# Patient Record
Sex: MALE | Race: WHITE | Employment: FULL TIME | URBAN - NONMETROPOLITAN AREA
[De-identification: names, ages, dates, MRNs, and addresses within clinical notes are randomized per-mention and may not be internally consistent; named-entity substitution may affect disease eponyms.]

---

## 2024-07-26 ENCOUNTER — OFFICE VISIT (OUTPATIENT)
Dept: FAMILY MEDICINE | Facility: OTHER | Age: 39
End: 2024-07-26

## 2024-07-26 VITALS
HEIGHT: 70 IN | DIASTOLIC BLOOD PRESSURE: 80 MMHG | OXYGEN SATURATION: 98 % | WEIGHT: 153 LBS | BODY MASS INDEX: 21.9 KG/M2 | HEART RATE: 88 BPM | RESPIRATION RATE: 16 BRPM | SYSTOLIC BLOOD PRESSURE: 120 MMHG | TEMPERATURE: 97.9 F

## 2024-07-26 DIAGNOSIS — S81.812A LACERATION OF LEFT LOWER EXTREMITY, INITIAL ENCOUNTER: Primary | ICD-10-CM

## 2024-07-26 DIAGNOSIS — Z02.6 ENCOUNTER RELATED TO WORKER'S COMPENSATION CLAIM: ICD-10-CM

## 2024-07-26 PROCEDURE — 90471 IMMUNIZATION ADMIN: CPT

## 2024-07-26 PROCEDURE — 250N000009 HC RX 250

## 2024-07-26 PROCEDURE — 90715 TDAP VACCINE 7 YRS/> IM: CPT

## 2024-07-26 PROCEDURE — 12001 RPR S/N/AX/GEN/TRNK 2.5CM/<: CPT

## 2024-07-26 PROCEDURE — 99203 OFFICE O/P NEW LOW 30 MIN: CPT | Mod: 25

## 2024-07-26 RX ORDER — LIDOCAINE HYDROCHLORIDE AND EPINEPHRINE 10; 10 MG/ML; UG/ML
4 INJECTION, SOLUTION INFILTRATION; PERINEURAL ONCE
Status: COMPLETED | OUTPATIENT
Start: 2024-07-26 | End: 2024-07-26

## 2024-07-26 RX ORDER — PREDNISOLONE ACETATE 10 MG/ML
1 SUSPENSION/ DROPS OPHTHALMIC 2 TIMES DAILY
COMMUNITY
Start: 2024-06-17

## 2024-07-26 RX ORDER — CEPHALEXIN 500 MG/1
500 CAPSULE ORAL 4 TIMES DAILY
Qty: 20 CAPSULE | Refills: 0 | Status: SHIPPED | OUTPATIENT
Start: 2024-07-26 | End: 2024-07-31

## 2024-07-26 RX ADMIN — LIDOCAINE HYDROCHLORIDE,EPINEPHRINE BITARTRATE 4 ML: 10; .01 INJECTION, SOLUTION INFILTRATION; PERINEURAL at 17:02

## 2024-07-26 ASSESSMENT — PAIN SCALES - GENERAL: PAINLEVEL: MILD PAIN (3)

## 2024-07-26 NOTE — PROGRESS NOTES
ASSESSMENT/PLAN:    (B26.385M) Laceration of left lower extremity, initial encounter  (primary encounter diagnosis)  Comment: Patient sustained an abrasion and laceration to his left thigh today at work when he was cut with a dorsal aspect of a catfish.  On exam the left inner thigh with 4 inch abrasion and 1.5 cm laceration approximately 0.5 cm wide.  We did attempt to approximate laceration with Steri-Strips which did not successfully approximate the wound.  I discussed the risks and benefits of laceration repair with sutures and patient verbalizes consent for lack repair.  4 cc of 1% lidocaine with epinephrine were injected at site as noted in procedure note below, 1 4 point 0 Ethilon suture was placed at site.  As this was a freshwater wound sustained from a catfish fin we did opt to initiate prophylactic antibiotics with Keflex.  Strict return to care parameters were reviewed with patient for signs and symptoms of infection or complications of suture repair.  Recommend he avoid submerging the wound until it is healed, may shower as normal, remove sutures in 7 days.  Plan: TDAP 7+ (ADACEL,BOOSTRIX), cephALEXin (KEFLEX)         500 MG capsule, lidocaine 1% with EPINEPHrine         1:100,000 injection 4 mL    Discussed warning signs/symptoms indicative of need to f/u    Follow up if symptoms persist or worsen or concerns    I have reviewed the nursing notes.  I have reviewed the findings, diagnosis, plan and need for follow up with the patient.    I explained my diagnostic considerations and recommendations to the patient, who voiced understanding and agreement with the treatment plan. All questions were answered. We discussed potential side effects of any prescribed or recommended therapies, as well as expectations for response to treatments.    FLACA BLUE, PABLO CNP  7/26/2024  3:03 PM    HPI:    Hamzah Jacobs is a 39 year old male  who presents to [facility] today for concerns of cut on leg.    Patient  "sustained a cut to his left leg today (7/26/2024).  He was at work for the state [employer] and he was for the dorsal fin of a catfish.  He is concerned about infection setting in.  He is unsure if he needs sutures.    Allergy to neomycin    No PCP on file.    History reviewed. No pertinent past medical history.  History reviewed. No pertinent surgical history.  Social History     Tobacco Use    Smoking status: Some Days     Current packs/day: 0.50     Types: Cigarettes     Passive exposure: Never    Smokeless tobacco: Never   Substance Use Topics    Alcohol use: Yes     Alcohol/week: 2.0 standard drinks of alcohol     Types: 2 Cans of beer per week     Current Outpatient Medications   Medication Sig Dispense Refill    cephALEXin (KEFLEX) 500 MG capsule Take 1 capsule (500 mg) by mouth 4 times daily for 5 days 20 capsule 0    prednisoLONE acetate (PRED FORTE) 1 % ophthalmic suspension Place 1 drop into both eyes 2 times daily       Allergies   Allergen Reactions    Neomycin Anaphylaxis and Swelling     Past medical history, past surgical history, current medications and allergies reviewed and accurate to the best of my knowledge.      ROS:  Refer to HPI    /80 (BP Location: Right arm, Patient Position: Chair, Cuff Size: Adult Large)   Pulse 88   Temp 97.9  F (36.6  C) (Tympanic)   Resp 16   Ht 1.778 m (5' 10\")   Wt 69.4 kg (153 lb)   SpO2 98%   BMI 21.95 kg/m      EXAM:  General Appearance: Well appearing 39 year old male, appropriate appearance for age. No acute distress   Eyes: conjunctivae normal without erythema or irritation, corneas clear, no drainage or crusting, no eyelid swelling, pupils equal   Oropharynx: moist mucous membranes, voice clear.    Nose:  Bilateral nares: no erythema, no edema, no drainage or congestion   Neck: supple  Respiratory: normal chest wall and respirations.  Normal effort. No increased work of breathing.  No cough appreciated.  Musculoskeletal:  Equal movement of " bilateral upper extremities.  Equal movement of bilateral lower extremities.  Normal gait.    Dermatological: Left inner thigh with 4 inch abrasion and 1.5 cm laceration approximately 0.5 cm wide.  Neuro: Alert and oriented to person, place, and time.  Cranial nerves II-XII grossly intact with no focal or lateralizing deficits.  Muscle tone normal.  Gait normal. No tremor.   Psychological: normal affect, alert, oriented, and pleasant.     Procedural note:   Options are discussed and patient decided to proceed with the suture placement.  Risks and benefits discussed.  Verbal consent obtained.    PROCEDURE:  Laceration repair  LACERATION: description 1.5 cm laceration to left inner thigh  ANESTHESIA:  Local using Lidocaine 1% with Epinephrine, total of 4 ml   PREPARATION:  Cleansed with chloroprep and Betadine  DEBRIDEMENT:  No debridement   CLOSURE:  Wound closed in one layer.  Skin closed with 1 suture using 4.0 Ethilon  SUTURES/STAPLES:  Suture/Staple removal in 7 days      Preparation: Patient was prepped and draped in usual sterile fashion.  Body area: Left thigh  Laceration length: 1.5 cm  Foreign bodies: none  Tendon involvement: none  Anesthesia: Local  Local anesthetic: Lidocaine  1% with epinephrine  Anesthetic total: 4 cc    Debridement: none  Skin closure: Closed with   Technique: interrupted  Approximation: close  Approximation difficulty: simple     Patient tolerance: Patient tolerated the procedure well with no immediate complications.

## 2024-07-26 NOTE — NURSING NOTE
"Chief Complaint   Patient presents with    Laceration     Cut on Left Leg-DOI 7/26/24       Initial /80 (BP Location: Right arm, Patient Position: Chair, Cuff Size: Adult Large)   Pulse 88   Temp 97.9  F (36.6  C) (Tympanic)   Resp 16   Ht 1.778 m (5' 10\")   Wt 69.4 kg (153 lb)   SpO2 98%   BMI 21.95 kg/m   Estimated body mass index is 21.95 kg/m  as calculated from the following:    Height as of this encounter: 1.778 m (5' 10\").    Weight as of this encounter: 69.4 kg (153 lb).      Medication Reconciliation: Complete.       Brianne Guadalupe LPN on 7/26/2024 at 3:08 PM     "

## (undated) RX ORDER — LIDOCAINE HYDROCHLORIDE AND EPINEPHRINE 10; 10 MG/ML; UG/ML
INJECTION, SOLUTION INFILTRATION; PERINEURAL
Status: DISPENSED
Start: 2024-07-26